# Patient Record
Sex: FEMALE | Race: BLACK OR AFRICAN AMERICAN
[De-identification: names, ages, dates, MRNs, and addresses within clinical notes are randomized per-mention and may not be internally consistent; named-entity substitution may affect disease eponyms.]

---

## 2019-12-25 ENCOUNTER — HOSPITAL ENCOUNTER (EMERGENCY)
Dept: HOSPITAL 63 - ER | Age: 34
Discharge: HOME | End: 2019-12-25
Payer: COMMERCIAL

## 2019-12-25 VITALS
DIASTOLIC BLOOD PRESSURE: 83 MMHG | SYSTOLIC BLOOD PRESSURE: 124 MMHG | SYSTOLIC BLOOD PRESSURE: 124 MMHG | DIASTOLIC BLOOD PRESSURE: 83 MMHG

## 2019-12-25 VITALS — WEIGHT: 190 LBS | HEIGHT: 66 IN | BODY MASS INDEX: 30.53 KG/M2

## 2019-12-25 DIAGNOSIS — J45.909: ICD-10-CM

## 2019-12-25 DIAGNOSIS — Z88.1: ICD-10-CM

## 2019-12-25 DIAGNOSIS — F41.9: Primary | ICD-10-CM

## 2019-12-25 LAB
ALBUMIN SERPL-MCNC: 3.7 G/DL (ref 3.4–5)
ALBUMIN/GLOB SERPL: 0.9 {RATIO} (ref 1–1.7)
ALP SERPL-CCNC: 109 U/L (ref 46–116)
ALT SERPL-CCNC: 69 U/L (ref 14–59)
AMPHETAMINE/METHAMPHETAMINE: (no result)
ANION GAP SERPL CALC-SCNC: 16 MMOL/L (ref 6–14)
APTT PPP: YELLOW S
AST SERPL-CCNC: 65 U/L (ref 15–37)
BACTERIA #/AREA URNS HPF: 0 /HPF
BARBITURATES UR-MCNC: (no result) UG/ML
BASOPHILS # BLD AUTO: 0.1 X10^3/UL (ref 0–0.2)
BASOPHILS NFR BLD: 1 % (ref 0–3)
BENZODIAZ UR-MCNC: (no result) UG/L
BILIRUB SERPL-MCNC: 0.3 MG/DL (ref 0.2–1)
BILIRUB UR QL STRIP: (no result)
BUN/CREAT SERPL: 6 (ref 6–20)
CA-I SERPL ISE-MCNC: 4 MG/DL (ref 7–20)
CALCIUM SERPL-MCNC: 8.9 MG/DL (ref 8.5–10.1)
CANNABINOIDS UR-MCNC: (no result) UG/L
CHLORIDE SERPL-SCNC: 100 MMOL/L (ref 98–107)
CO2 SERPL-SCNC: 21 MMOL/L (ref 21–32)
COCAINE UR-MCNC: (no result) NG/ML
CREAT SERPL-MCNC: 0.7 MG/DL (ref 0.6–1)
EOSINOPHIL NFR BLD: 0 % (ref 0–3)
EOSINOPHIL NFR BLD: 0 X10^3/UL (ref 0–0.7)
ERYTHROCYTE [DISTWIDTH] IN BLOOD BY AUTOMATED COUNT: 19 % (ref 11.5–14.5)
FIBRINOGEN PPP-MCNC: CLEAR MG/DL
GFR SERPLBLD BASED ON 1.73 SQ M-ARVRAT: 117.3 ML/MIN
GLOBULIN SER-MCNC: 4.3 G/DL (ref 2.2–3.8)
GLUCOSE SERPL-MCNC: 149 MG/DL (ref 70–99)
GLUCOSE UR STRIP-MCNC: (no result) MG/DL
HCT VFR BLD CALC: 46.5 % (ref 36–47)
HGB BLD-MCNC: 15.3 G/DL (ref 12–15.5)
LIPASE: 101 U/L (ref 73–393)
LYMPHOCYTES # BLD: 3.4 X10^3/UL (ref 1–4.8)
LYMPHOCYTES NFR BLD AUTO: 29 % (ref 24–48)
MCH RBC QN AUTO: 32 PG (ref 25–35)
MCHC RBC AUTO-ENTMCNC: 33 G/DL (ref 31–37)
MCV RBC AUTO: 98 FL (ref 79–100)
METHADONE SERPL-MCNC: (no result) NG/ML
MONO #: 0.8 X10^3/UL (ref 0–1.1)
MONOCYTES NFR BLD: 7 % (ref 0–9)
NEUT #: 7.4 X10^3UL (ref 1.8–7.7)
NEUTROPHILS NFR BLD AUTO: 64 % (ref 31–73)
NITRITE UR QL STRIP: (no result)
OPIATES UR-MCNC: (no result) NG/ML
PCP SERPL-MCNC: (no result) MG/DL
PLATELET # BLD AUTO: 297 X10^3/UL (ref 140–400)
POTASSIUM SERPL-SCNC: 2.8 MMOL/L (ref 3.5–5.1)
PROT SERPL-MCNC: 8 G/DL (ref 6.4–8.2)
RBC # BLD AUTO: 4.76 X10^6/UL (ref 3.5–5.4)
RBC #/AREA URNS HPF: (no result) /HPF (ref 0–2)
SODIUM SERPL-SCNC: 137 MMOL/L (ref 136–145)
SP GR UR STRIP: <=1.005
SQUAMOUS #/AREA URNS LPF: (no result) /LPF
UROBILINOGEN UR-MCNC: 0.2 MG/DL
WBC # BLD AUTO: 11.7 X10^3/UL (ref 4–11)
WBC #/AREA URNS HPF: (no result) /HPF (ref 0–4)

## 2019-12-25 PROCEDURE — 81001 URINALYSIS AUTO W/SCOPE: CPT

## 2019-12-25 PROCEDURE — 71275 CT ANGIOGRAPHY CHEST: CPT

## 2019-12-25 PROCEDURE — 85025 COMPLETE CBC W/AUTO DIFF WBC: CPT

## 2019-12-25 PROCEDURE — 80307 DRUG TEST PRSMV CHEM ANLYZR: CPT

## 2019-12-25 PROCEDURE — 96374 THER/PROPH/DIAG INJ IV PUSH: CPT

## 2019-12-25 PROCEDURE — 36415 COLL VENOUS BLD VENIPUNCTURE: CPT

## 2019-12-25 PROCEDURE — 99285 EMERGENCY DEPT VISIT HI MDM: CPT

## 2019-12-25 PROCEDURE — 84484 ASSAY OF TROPONIN QUANT: CPT

## 2019-12-25 PROCEDURE — 71046 X-RAY EXAM CHEST 2 VIEWS: CPT

## 2019-12-25 PROCEDURE — 80053 COMPREHEN METABOLIC PANEL: CPT

## 2019-12-25 PROCEDURE — 83690 ASSAY OF LIPASE: CPT

## 2019-12-25 NOTE — RAD
Exam: Chest 2 views

 

INDICATION: Chest pain

 

TECHNIQUE: Frontal and lateral views the chest

 

Comparisons: None

 

FINDINGS:

The cardiomediastinal silhouette and pulmonary vessels are within normal 

limits.

 

The lung and pleural spaces are clear.

 

IMPRESSION:

No acute cardiopulmonary process.

 

Electronically signed by: Eyal Rowe MD (12/25/2019 3:03 AM) [facility]

## 2019-12-25 NOTE — RAD
Exam: CT of chest with contrast

 

INDICATION: Tachycardia

 

TECHNIQUE: Sequential axial images through the chest obtained following 

the administration of 100 mL of Omni 350 IV contrast. Sagittal and coronal

reformatted images were reconstructed from the axial data and reviewed.

 

Comparisons: Chest x-ray same day

 

FINDINGS:

Visualized portions of the thyroid are unremarkable. No enlarged 

mediastinal lymph nodes are identified. Non enlarged right hilar lymph 

node is noted.

 

Heart size is normal. No pericardial effusion. Thoracic aorta has normal 

course and caliber. Pulmonary artery is not enlarged. No pulmonary embolus

identified within the main, lobar or segmental pulmonary arteries are 

identified.

 

Airways are patent. No consolidation or pneumothorax. Patchy areas of 

groundglass opacity noted within the right middle and lower lobe, favored 

represent atelectasis. No suspicious lung nodules are identified.

 

No pleural effusion or thickening.

 

Visualized upper abdomen is unremarkable.

 

 No suspicious osseous lesions or acute fractures.

 

IMPRESSION:

No pulmonary embolus identified within the main, lobar or segmental 

pulmonary arteries.

 

 

Exposure: One or more of the following in the visualized dose reduction 

techniques were utilized for this examination:

1.  Automated exposure control

2.  Adjustment of the MA and/or KV according to patient size

3.  Use of iterative of reconstructive technique

 

Electronically signed by: Eyal Rowe MD (12/25/2019 5:07 AM) [facility]

## 2019-12-25 NOTE — PHYS DOC
Past History


Past Medical History:  Asthma, Other


Additional Past Medical Histor:  GRAVES, GASTROPORESIS, PRE-DIABETIC


Past Surgical History:  Tonsillectomy, Other


Additional Past Surgical Histo:  LAPROSCOPIC


Alcohol Use:  Occasionally


Drug Use:  None





Adult General


Chief Complaint


Chief Complaint:  SHORTNESS OF BREATH





HPI


HPI


34-year-old female presents with shortness of breath. The patient was feeling 

short of breath when she went to bed last night. Today she was doing well most 

the day until bedtime. She then began to feel a chest heaviness and like she 

could not get a deep breath. She took several puffs of albuterol. This did not 

seem to help. She decided come the emergency room. The patient is on medications

for mild intermittent asthma, bipolar, anxiety, and alcohol abuse. No recent 

medication changes. She denies falls or trauma. No diaphoresis, fever, or 

chills. She is very worried.





Review of Systems


Review of Systems





Constitutional: Denies fever or chills []


Eyes: Denies change in visual acuity, redness, or eye pain []


HENT: Denies nasal congestion or sore throat []


Respiratory: shortness of breath []


Cardiovascular: No additional information not addressed in HPI []


GI: Denies abdominal pain, nausea, vomiting, bloody stools or diarrhea []


: Denies dysuria or hematuria []


Musculoskeletal: Denies back pain or joint pain []


Integument: Denies rash or skin lesions []


Neurologic: Denies headache, focal weakness or sensory changes []


Endocrine: Denies polyuria or polydipsia []





All other systems were reviewed and found to be within normal limits, except as 

documented in this note.





Current Medications


Current Medications





Current Medications








 Medications


  (Trade)  Dose


 Ordered  Sig/Marko  Start Time


 Stop Time Status Last Admin


Dose Admin


 


 Aspirin


  ([facility]


 Aspirin)  324 mg  1X  ONCE  12/25/19 02:15


 12/25/19 02:16 UNV  





 


 Sodium Chloride  1,000 ml @ 


 1,000 mls/hr  Q1H  12/25/19 02:15


 12/25/19 03:14 UNV  














Allergies


Allergies





Allergies








Coded Allergies Type Severity Reaction Last Updated Verified


 


  cefaclor Allergy Unknown  12/25/19 Yes











Physical Exam


Physical Exam





Constitutional: Well developed, well nourished, no acute distress, non-toxic 

appearance. []


HENT: Normocephalic, atraumatic, bilateral external ears normal, oropharynx 

moist, no oral exudates, nose normal. []


Eyes: PERRLA, EOMI, conjunctiva normal, no discharge. [] 


Neck: Normal range of motion, no tenderness, supple, no stridor. [] 


Cardiovascular: Heart rate 133, regular rhythm, no murmur []


Lungs & Thorax:  Bilateral breath sounds clear to auscultation []


Abdomen: Bowel sounds normal, soft, no tenderness, no masses, no pulsatile 

masses. [] 


Skin: Warm, dry, no erythema, no rash. [] 


Back: No tenderness, no CVA tenderness. [] 


Extremities: No tenderness, no cyanosis, no clubbing, ROM intact, no edema. [] 


Neurologic: Alert and oriented X 3, normal motor function, normal sensory 

function, no focal deficits noted. []


Psychologic: Affect nervous, judgement normal, mood anxious. []





Current Patient Data


Vital Signs





                                   Vital Signs








  Date Time  Temp Pulse Resp B/P (MAP) Pulse Ox O2 Delivery O2 Flow Rate FiO2


 


12/25/19 02:24 97.6 131 28  99 Room Air  











EKG


EKG


Sinus tachycardia, rate 133, normal axis, no ST elevations or depressions.[]





Radiology/Procedures


Radiology/Procedures


[]


Impressions:


Exam: Chest 2 views


 


INDICATION: Chest pain


 


TECHNIQUE: Frontal and lateral views the chest


 


Comparisons: None


 


FINDINGS:


The cardiomediastinal silhouette and pulmonary vessels are within normal 


limits.


 


The lung and pleural spaces are clear.


 


IMPRESSION:


No acute cardiopulmonary process.


 


Electronically signed by: Eyal Girard MD (12/25/2019 3:03 AM) [facility]














DICTATED AND SIGNED BY:     EYAL GIRARD MD


DATE:     12/25/19 0303





CC: MAEGAN SYLVESTER DO; JUAN MCCLAIN MD ~











Exam: CT of chest with contrast


 


INDICATION: Tachycardia


 


TECHNIQUE: Sequential axial images through the chest obtained following 


the administration of 100 mL of Omni 350 IV contrast. Sagittal and coronal


reformatted images were reconstructed from the axial data and reviewed.


 


Comparisons: Chest x-ray same day


 


FINDINGS:


Visualized portions of the thyroid are unremarkable. No enlarged 


mediastinal lymph nodes are identified. Non enlarged right hilar lymph 


node is noted.


 


Heart size is normal. No pericardial effusion. Thoracic aorta has normal 


course and caliber. Pulmonary artery is not enlarged. No pulmonary embolus


identified within the main, lobar or segmental pulmonary arteries are 


identified.


 


Airways are patent. No consolidation or pneumothorax. Patchy areas of 


groundglass opacity noted within the right middle and lower lobe, favored 


represent atelectasis. No suspicious lung nodules are identified.


 


No pleural effusion or thickening.


 


Visualized upper abdomen is unremarkable.


 


 No suspicious osseous lesions or acute fractures.


 


IMPRESSION:


No pulmonary embolus identified within the main, lobar or segmental 


pulmonary arteries.


 


 


Exposure: One or more of the following in the visualized dose reduction 


techniques were utilized for this examination:


1.  Automated exposure control


2.  Adjustment of the MA and/or KV according to patient size


3.  Use of iterative of reconstructive technique


 


Electronically signed by: Eyal Girard MD (12/25/2019 5:07 AM) [facility]














DICTATED AND SIGNED BY:     EYAL GIRARD MD


DATE:     12/25/19 0507





CC: MAEGAN SYLVESTER DO; JUAN MCCLAIN MD ~





Course & Med Decision Making


Course & Med Decision Making


Pertinent Labs and Imaging studies reviewed. (See chart for details)


The patient's labs are unremarkable. Her chest x-rays negative for acute 

findings. The patient's urine drug screen is only positive for alcohol. Given 

her persistent tachycardia and was in the 120s, I ordered a CTA of the chest. 

The CT is negative for pulmonary embolus.  Her heart rate has improved to the 

low 110s. I believe her albuterol wearing off. She is enough albuterol today to 

lower her potassium. I gave her 40 mEq of supplemental.  This could still be 

anxiety driven. The patient was given 2 mg of Ativan IV. He denies any 

significant abnormalities to warrant admission. The patient is stable for 

discharge at this time.


[]





Dragon Disclaimer


Dragon Disclaimer


This electronic medical record was generated, in whole or in part, using a voice

 recognition dictation system.





Departure


Departure:


Impression:  


   Primary Impression:  


   Anxiety reaction


   Additional Impression:  


   SOB (shortness of breath)


Disposition:  01 HOME, SELF-CARE


Condition:  STABLE


Referrals:  


JUAN MCCLAIN MD (PCP)


Patient Instructions:  Anxiety and Panic Attacks, Easy-to-Read, Chest Pain 

(Nonspecific), Easy-to-Read





Problem Qualifiers











MAEGAN SYLVESTER DO                 Dec 25, 2019 02:30

## 2022-01-18 ENCOUNTER — HOSPITAL ENCOUNTER (OUTPATIENT)
Dept: HOSPITAL 63 - LAB | Age: 37
End: 2022-01-18
Attending: INTERNAL MEDICINE
Payer: COMMERCIAL

## 2022-01-18 DIAGNOSIS — Z20.822: ICD-10-CM

## 2022-01-18 DIAGNOSIS — Z01.812: Primary | ICD-10-CM

## 2022-01-18 DIAGNOSIS — R11.10: ICD-10-CM

## 2022-01-18 DIAGNOSIS — R13.10: ICD-10-CM

## 2022-01-18 DIAGNOSIS — K21.9: ICD-10-CM

## 2022-01-18 PROCEDURE — U0003 INFECTIOUS AGENT DETECTION BY NUCLEIC ACID (DNA OR RNA); SEVERE ACUTE RESPIRATORY SYNDROME CORONAVIRUS 2 (SARS-COV-2) (CORONAVIRUS DISEASE [COVID-19]), AMPLIFIED PROBE TECHNIQUE, MAKING USE OF HIGH THROUGHPUT TECHNOLOGIES AS DESCRIBED BY CMS-2020-01-R: HCPCS

## 2022-01-21 ENCOUNTER — HOSPITAL ENCOUNTER (OUTPATIENT)
Dept: HOSPITAL 63 - SURG | Age: 37
Discharge: HOME | End: 2022-01-21
Attending: INTERNAL MEDICINE
Payer: COMMERCIAL

## 2022-01-21 VITALS — SYSTOLIC BLOOD PRESSURE: 137 MMHG | DIASTOLIC BLOOD PRESSURE: 91 MMHG

## 2022-01-21 DIAGNOSIS — R13.10: Primary | ICD-10-CM

## 2022-01-21 DIAGNOSIS — Z88.8: ICD-10-CM

## 2022-01-21 DIAGNOSIS — Z91.040: ICD-10-CM

## 2022-01-21 DIAGNOSIS — K44.9: ICD-10-CM

## 2022-01-21 DIAGNOSIS — R12: ICD-10-CM

## 2022-01-21 DIAGNOSIS — Z79.899: ICD-10-CM

## 2022-01-21 DIAGNOSIS — K21.00: ICD-10-CM

## 2022-01-21 DIAGNOSIS — Z72.89: ICD-10-CM

## 2022-01-21 DIAGNOSIS — Z98.890: ICD-10-CM

## 2022-01-21 DIAGNOSIS — K31.89: ICD-10-CM

## 2022-01-21 DIAGNOSIS — K29.50: ICD-10-CM

## 2022-01-21 LAB — U PREG PATIENT: NEGATIVE

## 2022-01-21 PROCEDURE — 88305 TISSUE EXAM BY PATHOLOGIST: CPT

## 2022-01-21 PROCEDURE — 43450 DILATE ESOPHAGUS 1/MULT PASS: CPT

## 2022-01-21 PROCEDURE — 81025 URINE PREGNANCY TEST: CPT

## 2022-01-21 PROCEDURE — 43239 EGD BIOPSY SINGLE/MULTIPLE: CPT

## 2022-01-21 PROCEDURE — 93005 ELECTROCARDIOGRAM TRACING: CPT

## 2022-01-21 PROCEDURE — 88342 IMHCHEM/IMCYTCHM 1ST ANTB: CPT

## 2022-01-21 NOTE — EKG
[facility] [address], [place] [address]

Test Date:    2022               Test Time:    11:54:48

Pat Name:     SELAM HICKS           Department:   

Patient ID:   [id number]           Room:          

Gender:       F                        Technician:   WOLFGANG

:          1985               Requested By: LY NAJERA

Order Number: [id number]            Reading MD:   Tremaine Jerry MD

                                 Measurements

Intervals                              Axis          

Rate:         111                      P:            31

VT:           140                      QRS:          47

QRSD:         64                       T:            19

QT:           316                                    

QTc:          433                                    

                           Interpretive Statements

SINUS TACHYCARDIA

Electronically Signed On 2022 9:30:43 CST by Tremaine Jerry MD

## 2022-01-21 NOTE — NUR
Patient sinus tachycardia on tele monitor, no complains of shortness of breath, chest pain, 
dizziness. Anesthesia aware, requested EKG.

## 2022-01-25 NOTE — PATHOLOGY
[facility] Accession Number: [id number]

.                                                                01

Material submitted:                                        .

stomach - ANTRUM BIOPSY

.                                                                01

Clinical history:                                          .

DYSPHAGIA, EGD

.                                                                02

**********************************************************************

Diagnosis:

Gastric biopsies, antrum:

- Congestion and focal slight chronic inflammation.

(M:rosalva; 01/25/2022)

[facility]  01/25/2022  1358 Local

**********************************************************************

.                                                                02

Comment:

Sections of the gastric biopsy reveal gastric antral and antral/body

transition mucosa showing congestion and focal slight chronic

inflammation.  A properly controlled immunoperoxidase stain for

Helicobacter is negative for Helicobacter organisms.

(JPM:; 01/25/2022)

.                                                                02

Electronically signed:                                     .

Shade Treadwell MD, Pathologist

NPI- [id number]

.                                                                01

Gross description:                                         .

The specimen is received in formalin, labeled "Jeovanny, Arin, antrum".

Received are 2 segments of pale tan tissue measuring 0.4 to 0.5 cm in

maximum dimensions. The specimen is submitted entirely in cassette

A1.([facility]; 1/24/2022)

[facility]  01/24/2022  1515 Local

.                                                                02

Pathologist provided ICD-10:

K29.50

.                                                                02

CPT                                                        .

026658, [id number]

Specimen Comment: A courtesy copy of this report has been sent to [phone], [phone]

Specimen Comment: [id number]

Specimen Comment: Report sent to  / DR MCCLAIN

Specimen Comment: A duplicate report has been generated due to demographic updates.

***Performed at:  01

   [facility] [facility]

   [address], [place], [place]  [id number]

   MD Favio Walters MD Phone:  [phone]

***Performed at:  02

   [facility]

   [address], [place]  [id number]

   MD Shade Treadwell MD Phone:  [phone]